# Patient Record
Sex: MALE | URBAN - METROPOLITAN AREA
[De-identification: names, ages, dates, MRNs, and addresses within clinical notes are randomized per-mention and may not be internally consistent; named-entity substitution may affect disease eponyms.]

---

## 2022-09-27 ENCOUNTER — APPOINTMENT (OUTPATIENT)
Age: 37
Setting detail: DERMATOLOGY
End: 2022-10-01

## 2022-09-27 DIAGNOSIS — L57.8 OTHER SKIN CHANGES DUE TO CHRONIC EXPOSURE TO NONIONIZING RADIATION: ICD-10-CM

## 2022-09-27 DIAGNOSIS — L81.4 OTHER MELANIN HYPERPIGMENTATION: ICD-10-CM

## 2022-09-27 PROCEDURE — 99203 OFFICE O/P NEW LOW 30 MIN: CPT

## 2022-09-27 PROCEDURE — OTHER PRESCRIPTION: OTHER

## 2022-09-27 PROCEDURE — OTHER FOLLOW UP FOR NEXT VISIT: OTHER

## 2022-09-27 PROCEDURE — OTHER TREATMENT REGIMEN: OTHER

## 2022-09-27 PROCEDURE — OTHER COUNSELING: OTHER

## 2022-09-27 RX ORDER — FLUOROURACIL 5 MG/G
THIN COAT CREAM TOPICAL DAILY
Qty: 40 | Refills: 0 | Status: ERX | COMMUNITY
Start: 2022-09-27

## 2022-09-27 ASSESSMENT — LOCATION SIMPLE DESCRIPTION DERM
LOCATION SIMPLE: RIGHT CHEEK
LOCATION SIMPLE: NOSE
LOCATION SIMPLE: LEFT CHEEK

## 2022-09-27 ASSESSMENT — BSA RASH: BSA RASH: 2

## 2022-09-27 ASSESSMENT — LOCATION DETAILED DESCRIPTION DERM
LOCATION DETAILED: RIGHT LATERAL MALAR CHEEK
LOCATION DETAILED: NASAL SUPRATIP
LOCATION DETAILED: LEFT LATERAL MALAR CHEEK

## 2022-09-27 ASSESSMENT — LOCATION ZONE DERM
LOCATION ZONE: NOSE
LOCATION ZONE: FACE

## 2022-09-27 NOTE — PROCEDURE: TREATMENT REGIMEN
Detail Level: Simple
Plan: We discussed since the patient is beginning fluorouracil for his actinic damage, we could also use this to treat his hyperpigmentation distributed on his face. He will apply this once daily as needed.
Plan: Based on the patient’s history and the recurring nature of the lesion, we discussed the likelihood of this being actinic damage. We discussed observation vs topical regimen. At this time, the patient will begin treating the site with fluorouracil once daily for 2-4 weeks. If there is no reaction with the cream after 4 weeks, we may need to readdress if symptoms return. Follow up after treatment or with photos if there is a reaction or return of symptoms.
Initiate Treatment: Fluorouracil

## 2022-09-27 NOTE — HPI: SKIN LESION
What Type Of Note Output Would You Prefer (Optional)?: Bullet Format
How Severe Is Your Skin Lesion?: moderate
treated_been_treated
Is This A New Presentation, Or A Follow-Up?: Skin Lesion
Additional History: Patient had red, flaking spot on nose treated with liquid nitrogen by PCP about 8 months ago. He denies any scabbing falling off after treatment. He reports that every once and a while it will become red and flake off. He denies any pain associated with it.  Currently patient states that the lesion seems to be gone but he will have occasional spots on the nose that get scaly peel and then leave slight redness. They are slightly sensitive but never bleed. He doesn’t have any recollection of any significant sunburns as a child but he did grow up in [place]
[facility]

## 2022-09-27 NOTE — PROCEDURE: FOLLOW UP FOR NEXT VISIT
Scheduled For Follow Up In (Optional): PRN
Scheduled For Follow Up In (Optional): 2 months
Detail Level: Simple

## 2023-06-23 ENCOUNTER — HOSPITAL ENCOUNTER (EMERGENCY)
Facility: CLINIC | Age: 38
Discharge: HOME | End: 2023-06-23
Attending: EMERGENCY MEDICINE | Admitting: EMERGENCY MEDICINE
Payer: COMMERCIAL

## 2023-06-23 VITALS
BODY MASS INDEX: 30.03 KG/M2 | SYSTOLIC BLOOD PRESSURE: 111 MMHG | DIASTOLIC BLOOD PRESSURE: 76 MMHG | WEIGHT: 234 LBS | OXYGEN SATURATION: 96 % | RESPIRATION RATE: 12 BRPM | HEART RATE: 77 BPM | HEIGHT: 74 IN | TEMPERATURE: 97.6 F

## 2023-06-23 DIAGNOSIS — Z00.00 VISIT FOR WELL MAN HEALTH CHECK: ICD-10-CM

## 2023-06-23 PROCEDURE — 99282 EMERGENCY DEPT VISIT SF MDM: CPT | Performed by: EMERGENCY MEDICINE

## 2023-06-23 ASSESSMENT — ACTIVITIES OF DAILY LIVING (ADL): ADLS_ACUITY_SCORE: 35

## 2023-06-23 NOTE — DISCHARGE INSTRUCTIONS
Please make an appointment to follow up with Primary Care - [facility] (phone: [phone]) and Primary Care - [facility] (phone: [phone]) as soon as possible.    Please call either of the above phone numbers to make a primary care appointment.    Please return to the emergency department if you develop new or any acute problems.

## 2023-06-23 NOTE — ED TRIAGE NOTES
Pt came in to speak patient but patient was already discharge.  Pt decided he should stay and get routine blood work checked.       Triage Assessment     Row Name 06/23/23 0834       Triage Assessment (Adult)    Airway WDL WDL       Respiratory WDL    Respiratory WDL WDL       Skin Circulation/Temperature WDL    Skin Circulation/Temperature WDL WDL       Cardiac WDL    Cardiac WDL WDL       Peripheral/Neurovascular WDL    Peripheral Neurovascular WDL WDL       Cognitive/Neuro/Behavioral WDL    Cognitive/Neuro/Behavioral WDL WDL

## 2023-06-23 NOTE — ED PROVIDER NOTES
"ED Provider Note  [facility]      History     Chief Complaint   Patient presents with     Labs Only     HPI  Danielito Lozada is a 38 year old male who came into triage initially to check on a friend who he dropped off last night but has subsequently been discharged.  He reported that he decided he would then check in to be seen for a \"physical exam\".  When I came and evaluated the patient he denied any acute complaints.  He does note that he has \"allergies\" that are flaring up but that is chronic, baseline, and not new or acute.  He states that he is here to have a physical exam as he has not established primary care.  Patient denies any new or concerning symptoms.  He reports he does not have a primary care doctor.    Past Medical History  Past Medical History:   Diagnosis Date     Allergic conjunctivitis      Blind left eye      Chronic headache      Constipation      Past Surgical History:   Procedure Laterality Date     EYE SURGERY  2001    LT     TESTICLE SURGERY       NO ACTIVE MEDICATIONS      No Known Allergies  Family History  History reviewed. No pertinent family history.  Social History   Social History     Tobacco Use     Smoking status: Never   Substance Use Topics     Alcohol use: No     Drug use: No         A medically appropriate review of systems was performed with pertinent positives and negatives noted in the HPI, and all other systems negative.    Physical Exam   BP: 111/76  Pulse: 77  Temp: 97.6  F (36.4  C)  Resp: 12  Height: 188 cm (6' 2\")  Weight: 106.1 kg (234 lb)  SpO2: 96 %  Physical Exam  General: Awake alert no acute distress  Lungs: Breathing comfortably on room air  CV: Regular rate  Psych: Calm, cooperative, pleasant.  Neuro: Patient is awake alert answers questions appropriately.  Moves upper and lower extremities normally.  Normal gait.    ED Course, Procedures, & Data      Procedures            No results found for any visits on 06/23/23.  Medications - " "No data to display  Labs Ordered and Resulted from Time of ED Arrival to Time of ED Departure - No data to display  No orders to display          Critical care was not performed.     Medical Decision Making  The patient's presentation was of straightforward complexity (a clearly self-limited or minor problem).    The patient's evaluation involved:  review of external note(s) from 2 sources (see separate area of note for details)    The patient's management necessitated only low risk treatment.      Assessment & Plan    Mom is a 38-year-old male who presents to the emergency department seeking a \" a physical and routine labs to be checked\".    On exam he is well-appearing, nontoxic, normal vital signs, normal mental status and has no acute medical complaints today.  He does note a past medical history significant of allergies and chronic headache which is corroborated in the chart.  He has no new or acute complaints today in the emergency department.  Patient notes that he does not have a primary care physician so I was able to give him resources for primary care.  Given that he has no acute complaints today no further work-up or evaluation was necessary.    Patient was discharged with resources for primary care.    I have reviewed the nursing notes. I have reviewed the findings, diagnosis, plan and need for follow up with the patient.    New Prescriptions    No medications on file       Final diagnoses:   Visit for [facility] health check       Jesús Gonzalez  [facility] EMERGENCY DEPARTMENT  6/23/2023     Jesús Gonzalez MD  06/23/23 0905    "